# Patient Record
Sex: FEMALE | Race: WHITE | Employment: OTHER | URBAN - METROPOLITAN AREA
[De-identification: names, ages, dates, MRNs, and addresses within clinical notes are randomized per-mention and may not be internally consistent; named-entity substitution may affect disease eponyms.]

---

## 2023-04-03 ENCOUNTER — HOSPITAL ENCOUNTER (INPATIENT)
Age: 78
LOS: 2 days | Discharge: HOME | DRG: 190 | End: 2023-04-05
Attending: EMERGENCY MEDICINE | Admitting: INTERNAL MEDICINE
Payer: MEDICARE

## 2023-04-03 ENCOUNTER — APPOINTMENT (OUTPATIENT)
Dept: CT IMAGING | Facility: CLINIC | Age: 78
DRG: 190 | End: 2023-04-03
Payer: MEDICARE

## 2023-04-03 DIAGNOSIS — J44.1 ACUTE EXACERBATION OF CHRONIC OBSTRUCTIVE PULMONARY DISEASE (COPD) (HCC): ICD-10-CM

## 2023-04-03 DIAGNOSIS — I82.4Z2 ACUTE DEEP VEIN THROMBOSIS (DVT) OF DISTAL VEIN OF LEFT LOWER EXTREMITY (HCC): ICD-10-CM

## 2023-04-03 DIAGNOSIS — I27.20 PULMONARY HYPERTENSION (HCC): ICD-10-CM

## 2023-04-03 DIAGNOSIS — R06.00 ACUTE DYSPNEA: Primary | ICD-10-CM

## 2023-04-03 PROBLEM — E83.51 HYPOCALCEMIA: Status: ACTIVE | Noted: 2019-12-16

## 2023-04-03 PROBLEM — I95.9 HYPOTENSION: Status: ACTIVE | Noted: 2019-12-31

## 2023-04-03 PROBLEM — R26.89 IMPAIRMENT OF BALANCE: Status: ACTIVE | Noted: 2021-03-09

## 2023-04-03 PROBLEM — D72.829 LEUKOCYTOSIS: Status: ACTIVE | Noted: 2019-12-16

## 2023-04-03 PROBLEM — R06.89 HYPERCAPNIA: Status: ACTIVE | Noted: 2019-12-20

## 2023-04-03 PROBLEM — I10 ESSENTIAL HYPERTENSION: Status: ACTIVE | Noted: 2019-12-12

## 2023-04-03 PROBLEM — R74.8 ALKALINE PHOSPHATASE RAISED: Status: ACTIVE | Noted: 2019-12-31

## 2023-04-03 PROBLEM — J43.8 OTHER EMPHYSEMA (HCC): Status: ACTIVE | Noted: 2019-12-31

## 2023-04-03 PROBLEM — G93.32 CHRONIC FATIGUE SYNDROME: Status: ACTIVE | Noted: 2023-04-03

## 2023-04-03 PROBLEM — F41.1 GENERALIZED ANXIETY DISORDER: Status: ACTIVE | Noted: 2023-04-03

## 2023-04-03 PROBLEM — I71.43 ANEURYSM OF INFRARENAL ABDOMINAL AORTA (HCC): Status: ACTIVE | Noted: 2019-12-12

## 2023-04-03 PROBLEM — Z95.828 HISTORY OF REPAIR OF ANEURYSM OF ABDOMINAL AORTA USING ENDOVASCULAR STENT GRAFT: Status: ACTIVE | Noted: 2019-12-12

## 2023-04-03 PROBLEM — E78.5 HYPERLIPIDEMIA: Status: ACTIVE | Noted: 2023-04-03

## 2023-04-03 PROBLEM — Z99.81 DEPENDENCE ON SUPPLEMENTAL OXYGEN: Status: ACTIVE | Noted: 2019-12-12

## 2023-04-03 PROBLEM — G47.00 INSOMNIA: Status: ACTIVE | Noted: 2023-04-03

## 2023-04-03 PROBLEM — J44.9 CHRONIC OBSTRUCTIVE LUNG DISEASE (HCC): Status: ACTIVE | Noted: 2019-12-31

## 2023-04-03 PROBLEM — E03.9 HYPOTHYROIDISM: Status: ACTIVE | Noted: 2023-04-03

## 2023-04-03 PROBLEM — R09.02 HYPOXIA: Status: ACTIVE | Noted: 2019-12-23

## 2023-04-03 PROBLEM — R06.09 DYSPNEA ON EXERTION: Status: ACTIVE | Noted: 2019-12-16

## 2023-04-03 LAB
ABSOLUTE EOS #: 0.1 K/UL (ref 0–0.4)
ABSOLUTE LYMPH #: 0.9 K/UL (ref 1–4.8)
ABSOLUTE MONO #: 0.5 K/UL (ref 0.1–1.2)
ANION GAP SERPL CALCULATED.3IONS-SCNC: 12 MMOL/L (ref 9–17)
BASOPHILS # BLD: 1 % (ref 0–2)
BASOPHILS ABSOLUTE: 0 K/UL (ref 0–0.2)
BNP SERPL-MCNC: ABNORMAL PG/ML
BUN SERPL-MCNC: 21 MG/DL (ref 8–23)
CALCIUM SERPL-MCNC: 9.5 MG/DL (ref 8.6–10.4)
CHLORIDE SERPL-SCNC: 107 MMOL/L (ref 98–107)
CO2 SERPL-SCNC: 22 MMOL/L (ref 20–31)
CREAT SERPL-MCNC: 1.1 MG/DL (ref 0.5–0.9)
CRP SERPL HS-MCNC: 5.1 MG/L (ref 0–5)
D DIMER BLD IA.RAPID-MCNC: 8.27 MG/L FEU
EOSINOPHILS RELATIVE PERCENT: 2 % (ref 1–4)
GFR SERPL CREATININE-BSD FRML MDRD: 51 ML/MIN/1.73M2
GLUCOSE SERPL-MCNC: 94 MG/DL (ref 70–99)
HCT VFR BLD AUTO: 41.1 % (ref 36–46)
HGB BLD-MCNC: 13.4 G/DL (ref 12–16)
LYMPHOCYTES # BLD: 16 % (ref 24–44)
MCH RBC QN AUTO: 27.4 PG (ref 26–34)
MCHC RBC AUTO-ENTMCNC: 32.6 G/DL (ref 31–37)
MCV RBC AUTO: 83.9 FL (ref 80–100)
MONOCYTES # BLD: 10 % (ref 2–11)
PDW BLD-RTO: 16 % (ref 12.5–15.4)
PLATELET # BLD AUTO: 226 K/UL (ref 140–450)
PMV BLD AUTO: 8.4 FL (ref 6–12)
POTASSIUM SERPL-SCNC: 4.9 MMOL/L (ref 3.7–5.3)
RBC # BLD: 4.9 M/UL (ref 4–5.2)
SARS-COV-2 RDRP RESP QL NAA+PROBE: NOT DETECTED
SEG NEUTROPHILS: 71 % (ref 36–66)
SEGMENTED NEUTROPHILS ABSOLUTE COUNT: 3.9 K/UL (ref 1.8–7.7)
SODIUM SERPL-SCNC: 141 MMOL/L (ref 135–144)
SPECIMEN DESCRIPTION: NORMAL
TROPONIN I SERPL DL<=0.01 NG/ML-MCNC: 19 NG/L (ref 0–14)
TROPONIN I SERPL DL<=0.01 NG/ML-MCNC: 22 NG/L (ref 0–14)
WBC # BLD AUTO: 5.4 K/UL (ref 3.5–11)

## 2023-04-03 PROCEDURE — 80048 BASIC METABOLIC PNL TOTAL CA: CPT

## 2023-04-03 PROCEDURE — 84484 ASSAY OF TROPONIN QUANT: CPT

## 2023-04-03 PROCEDURE — 96374 THER/PROPH/DIAG INJ IV PUSH: CPT

## 2023-04-03 PROCEDURE — 2580000003 HC RX 258: Performed by: NURSE PRACTITIONER

## 2023-04-03 PROCEDURE — 85379 FIBRIN DEGRADATION QUANT: CPT

## 2023-04-03 PROCEDURE — 85025 COMPLETE CBC W/AUTO DIFF WBC: CPT

## 2023-04-03 PROCEDURE — 6360000004 HC RX CONTRAST MEDICATION: Performed by: EMERGENCY MEDICINE

## 2023-04-03 PROCEDURE — 86140 C-REACTIVE PROTEIN: CPT

## 2023-04-03 PROCEDURE — 83880 ASSAY OF NATRIURETIC PEPTIDE: CPT

## 2023-04-03 PROCEDURE — 99222 1ST HOSP IP/OBS MODERATE 55: CPT | Performed by: NURSE PRACTITIONER

## 2023-04-03 PROCEDURE — 99285 EMERGENCY DEPT VISIT HI MDM: CPT

## 2023-04-03 PROCEDURE — 94761 N-INVAS EAR/PLS OXIMETRY MLT: CPT

## 2023-04-03 PROCEDURE — 2700000000 HC OXYGEN THERAPY PER DAY

## 2023-04-03 PROCEDURE — 71260 CT THORAX DX C+: CPT | Performed by: REGISTERED NURSE

## 2023-04-03 PROCEDURE — 6360000002 HC RX W HCPCS: Performed by: NURSE PRACTITIONER

## 2023-04-03 PROCEDURE — 2580000003 HC RX 258: Performed by: EMERGENCY MEDICINE

## 2023-04-03 PROCEDURE — 6360000002 HC RX W HCPCS: Performed by: EMERGENCY MEDICINE

## 2023-04-03 PROCEDURE — 2060000000 HC ICU INTERMEDIATE R&B

## 2023-04-03 PROCEDURE — 87635 SARS-COV-2 COVID-19 AMP PRB: CPT

## 2023-04-03 PROCEDURE — 93005 ELECTROCARDIOGRAM TRACING: CPT | Performed by: REGISTERED NURSE

## 2023-04-03 PROCEDURE — 36415 COLL VENOUS BLD VENIPUNCTURE: CPT

## 2023-04-03 RX ORDER — ACETAMINOPHEN 650 MG/1
650 SUPPOSITORY RECTAL EVERY 6 HOURS PRN
Status: DISCONTINUED | OUTPATIENT
Start: 2023-04-03 | End: 2023-04-05 | Stop reason: HOSPADM

## 2023-04-03 RX ORDER — SODIUM CHLORIDE 9 MG/ML
INJECTION, SOLUTION INTRAVENOUS PRN
Status: DISCONTINUED | OUTPATIENT
Start: 2023-04-03 | End: 2023-04-05 | Stop reason: HOSPADM

## 2023-04-03 RX ORDER — SODIUM CHLORIDE 0.9 % (FLUSH) 0.9 %
10 SYRINGE (ML) INJECTION PRN
Status: DISCONTINUED | OUTPATIENT
Start: 2023-04-03 | End: 2023-04-05 | Stop reason: HOSPADM

## 2023-04-03 RX ORDER — PREDNISONE 20 MG/1
40 TABLET ORAL DAILY
Status: DISCONTINUED | OUTPATIENT
Start: 2023-04-06 | End: 2023-04-04

## 2023-04-03 RX ORDER — SODIUM CHLORIDE 9 MG/ML
INJECTION, SOLUTION INTRAVENOUS CONTINUOUS
Status: DISCONTINUED | OUTPATIENT
Start: 2023-04-03 | End: 2023-04-03

## 2023-04-03 RX ORDER — ONDANSETRON 2 MG/ML
4 INJECTION INTRAMUSCULAR; INTRAVENOUS EVERY 6 HOURS PRN
Status: DISCONTINUED | OUTPATIENT
Start: 2023-04-03 | End: 2023-04-04

## 2023-04-03 RX ORDER — ONDANSETRON 2 MG/ML
4 INJECTION INTRAMUSCULAR; INTRAVENOUS ONCE
Status: COMPLETED | OUTPATIENT
Start: 2023-04-03 | End: 2023-04-03

## 2023-04-03 RX ORDER — IPRATROPIUM BROMIDE AND ALBUTEROL SULFATE 2.5; .5 MG/3ML; MG/3ML
1 SOLUTION RESPIRATORY (INHALATION)
Status: DISCONTINUED | OUTPATIENT
Start: 2023-04-04 | End: 2023-04-03

## 2023-04-03 RX ORDER — ALBUTEROL SULFATE 2.5 MG/3ML
2.5 SOLUTION RESPIRATORY (INHALATION)
Status: DISCONTINUED | OUTPATIENT
Start: 2023-04-03 | End: 2023-04-05 | Stop reason: HOSPADM

## 2023-04-03 RX ORDER — ENOXAPARIN SODIUM 100 MG/ML
40 INJECTION SUBCUTANEOUS DAILY
Status: DISCONTINUED | OUTPATIENT
Start: 2023-04-04 | End: 2023-04-05

## 2023-04-03 RX ORDER — SODIUM CHLORIDE 0.9 % (FLUSH) 0.9 %
5-40 SYRINGE (ML) INJECTION EVERY 12 HOURS SCHEDULED
Status: DISCONTINUED | OUTPATIENT
Start: 2023-04-03 | End: 2023-04-05 | Stop reason: HOSPADM

## 2023-04-03 RX ORDER — METHYLPREDNISOLONE SODIUM SUCCINATE 40 MG/ML
40 INJECTION, POWDER, LYOPHILIZED, FOR SOLUTION INTRAMUSCULAR; INTRAVENOUS EVERY 6 HOURS
Status: DISCONTINUED | OUTPATIENT
Start: 2023-04-03 | End: 2023-04-04

## 2023-04-03 RX ORDER — IPRATROPIUM BROMIDE AND ALBUTEROL SULFATE 2.5; .5 MG/3ML; MG/3ML
1 SOLUTION RESPIRATORY (INHALATION) 2 TIMES DAILY
Status: DISCONTINUED | OUTPATIENT
Start: 2023-04-03 | End: 2023-04-05 | Stop reason: HOSPADM

## 2023-04-03 RX ORDER — POLYETHYLENE GLYCOL 3350 17 G/17G
17 POWDER, FOR SOLUTION ORAL DAILY PRN
Status: DISCONTINUED | OUTPATIENT
Start: 2023-04-03 | End: 2023-04-05 | Stop reason: HOSPADM

## 2023-04-03 RX ORDER — ACETAMINOPHEN 325 MG/1
650 TABLET ORAL EVERY 6 HOURS PRN
Status: DISCONTINUED | OUTPATIENT
Start: 2023-04-03 | End: 2023-04-05 | Stop reason: HOSPADM

## 2023-04-03 RX ORDER — SODIUM CHLORIDE 0.9 % (FLUSH) 0.9 %
5-40 SYRINGE (ML) INJECTION PRN
Status: DISCONTINUED | OUTPATIENT
Start: 2023-04-03 | End: 2023-04-05 | Stop reason: HOSPADM

## 2023-04-03 RX ORDER — FAMOTIDINE 20 MG/1
20 TABLET, FILM COATED ORAL DAILY
Status: DISCONTINUED | OUTPATIENT
Start: 2023-04-04 | End: 2023-04-05 | Stop reason: HOSPADM

## 2023-04-03 RX ORDER — ONDANSETRON 4 MG/1
4 TABLET, ORALLY DISINTEGRATING ORAL EVERY 8 HOURS PRN
Status: DISCONTINUED | OUTPATIENT
Start: 2023-04-03 | End: 2023-04-04

## 2023-04-03 RX ORDER — SODIUM CHLORIDE 9 MG/ML
INJECTION, SOLUTION INTRAVENOUS CONTINUOUS
Status: ACTIVE | OUTPATIENT
Start: 2023-04-03 | End: 2023-04-04

## 2023-04-03 RX ORDER — 0.9 % SODIUM CHLORIDE 0.9 %
70 INTRAVENOUS SOLUTION INTRAVENOUS ONCE
Status: COMPLETED | OUTPATIENT
Start: 2023-04-03 | End: 2023-04-03

## 2023-04-03 RX ADMIN — ONDANSETRON 4 MG: 2 INJECTION INTRAMUSCULAR; INTRAVENOUS at 19:12

## 2023-04-03 RX ADMIN — METHYLPREDNISOLONE SODIUM SUCCINATE 40 MG: 40 INJECTION, POWDER, FOR SOLUTION INTRAMUSCULAR; INTRAVENOUS at 23:28

## 2023-04-03 RX ADMIN — SODIUM CHLORIDE, PRESERVATIVE FREE 10 ML: 5 INJECTION INTRAVENOUS at 23:25

## 2023-04-03 RX ADMIN — SODIUM CHLORIDE, PRESERVATIVE FREE 10 ML: 5 INJECTION INTRAVENOUS at 19:03

## 2023-04-03 RX ADMIN — IOPAMIDOL 80 ML: 755 INJECTION, SOLUTION INTRAVENOUS at 19:02

## 2023-04-03 RX ADMIN — SODIUM CHLORIDE: 9 INJECTION, SOLUTION INTRAVENOUS at 23:31

## 2023-04-03 RX ADMIN — SODIUM CHLORIDE 70 ML: 9 INJECTION, SOLUTION INTRAVENOUS at 19:03

## 2023-04-03 ASSESSMENT — PAIN - FUNCTIONAL ASSESSMENT: PAIN_FUNCTIONAL_ASSESSMENT: 0-10

## 2023-04-03 ASSESSMENT — ENCOUNTER SYMPTOMS
ABDOMINAL PAIN: 0
BACK PAIN: 0
COUGH: 0
SORE THROAT: 0
SHORTNESS OF BREATH: 1
NAUSEA: 0
DIARRHEA: 0
VOMITING: 0

## 2023-04-03 ASSESSMENT — LIFESTYLE VARIABLES
HOW MANY STANDARD DRINKS CONTAINING ALCOHOL DO YOU HAVE ON A TYPICAL DAY: PATIENT DOES NOT DRINK
HOW OFTEN DO YOU HAVE A DRINK CONTAINING ALCOHOL: NEVER

## 2023-04-03 ASSESSMENT — PAIN SCALES - GENERAL
PAINLEVEL_OUTOF10: 0
PAINLEVEL_OUTOF10: 0

## 2023-04-03 NOTE — ED PROVIDER NOTES
Cardiovascular:  Negative for chest pain and palpitations. Gastrointestinal:  Negative for abdominal pain, diarrhea, nausea and vomiting. Genitourinary:  Negative for dysuria and hematuria. Musculoskeletal:  Negative for back pain and neck pain. Neurological:  Negative for dizziness and headaches. All other systems negative except as marked. PHYSICAL EXAM  (up to 7 for level 4, 8 or more for level 5)      INITIAL VITALS:  height is 5' 9\" (1.753 m) and weight is 71.6 kg (157 lb 12.8 oz). Her oral temperature is 97.5 °F (36.4 °C). Her blood pressure is 128/79 and her pulse is 75. Her respiration is 17 and oxygen saturation is 97%. Vital signs reviewed. Physical Exam  Constitutional:       General: She is not in acute distress. Appearance: Normal appearance. She is well-developed. She is not ill-appearing or toxic-appearing. HENT:      Head: Normocephalic and atraumatic. Neck:      Vascular: No JVD. Trachea: No tracheal deviation. Cardiovascular:      Rate and Rhythm: Normal rate and regular rhythm. Pulses: Normal pulses. Heart sounds: Normal heart sounds. Pulmonary:      Effort: Pulmonary effort is normal.      Breath sounds: Normal breath sounds. Abdominal:      Palpations: Abdomen is soft. There is no hepatomegaly, splenomegaly or mass. Tenderness: There is no abdominal tenderness. There is no guarding or rebound. Musculoskeletal:      Cervical back: Neck supple. Right lower leg: No edema. Left lower leg: No edema. Skin:     General: Skin is warm and dry. Capillary Refill: Capillary refill takes less than 2 seconds. Neurological:      Mental Status: She is alert.    Psychiatric:         Mood and Affect: Mood normal.         Behavior: Behavior normal.         DIFFERENTIAL DIAGNOSIS / MDM     Differential diagnosis considered: ACS, PE, pneumonia, COVID, CHF, anemia    Chronic Conditions affecting care (DM,HTN,CA, etc):

## 2023-04-03 NOTE — ED NOTES
Patient vomited up green bile. Patient states this is the second time she had vomited since she has been here.       Katelyn Julio 139, RN  04/03/23 4612

## 2023-04-03 NOTE — ED NOTES
Iv attempt x2 , unable to obtain access, pt tolerated without complaints,2x2  applied to [facility], [address] [place] [address], RN  04/03/23 1648

## 2023-04-03 NOTE — ED PROVIDER NOTES
TROPONIN - Abnormal; Notable for the following components:    Troponin, High Sensitivity 22 (*)     All other components within normal limits   BRAIN NATRIURETIC PEPTIDE - Abnormal; Notable for the following components:    Pro-BNP 12,045 (*)     All other components within normal limits   TROPONIN - Abnormal; Notable for the following components:    Troponin, High Sensitivity 19 (*)     All other components within normal limits   C-REACTIVE PROTEIN - Abnormal; Notable for the following components:    CRP 5.1 (*)     All other components within normal limits   BASIC METABOLIC PANEL W/ REFLEX TO MG FOR LOW K - Abnormal; Notable for the following components:    Glucose 114 (*)     Creatinine 1.11 (*)     Est, Glom Filt Rate 51 (*)     All other components within normal limits   CBC - Abnormal; Notable for the following components:    RDW 14.8 (*)     All other components within normal limits   BASIC METABOLIC PANEL W/ REFLEX TO MG FOR LOW K - Abnormal; Notable for the following components:    Glucose 138 (*)     BUN 27 (*)     Creatinine 1.20 (*)     Est, Glom Filt Rate 46 (*)     Bun/Cre Ratio 23 (*)     All other components within normal limits   CBC - Abnormal; Notable for the following components:    RDW 15.0 (*)     All other components within normal limits   COVID-19, RAPID   GRAM STAIN   CULTURE, RESPIRATORY   D-DIMER, QUANTITATIVE   TSH WITH REFLEX        CT CHEST PULMONARY EMBOLISM W CONTRAST (Final result)  Result time 04/03/23 21:27:17  Final result by Burt Bamberger, MD (04/03/23 21:27:17)                Impression:    1. No evidence of acute pulmonary embolism or acute aortic disease. Main   pulmonary artery appears prominent measuring 3.3 cm possibly related to   pulmonary arterial hypertension. 2. Mediastinal and right hilar lymphadenopathy. 3. Underlying severe emphysema. No active infiltrates or suspicious nodules   or masses. Interstitial thickening in both lower lobes.   No active pleural

## 2023-04-04 ENCOUNTER — APPOINTMENT (OUTPATIENT)
Dept: GENERAL RADIOLOGY | Age: 78
DRG: 190 | End: 2023-04-04
Payer: MEDICARE

## 2023-04-04 PROBLEM — I50.9 NEW ONSET OF CONGESTIVE HEART FAILURE (HCC): Status: ACTIVE | Noted: 2023-04-04

## 2023-04-04 LAB
ANION GAP SERPL CALCULATED.3IONS-SCNC: 12 MMOL/L (ref 9–17)
BUN SERPL-MCNC: 18 MG/DL (ref 8–23)
BUN/CREAT BLD: 16 (ref 9–20)
CALCIUM SERPL-MCNC: 8.9 MG/DL (ref 8.6–10.4)
CHLORIDE SERPL-SCNC: 106 MMOL/L (ref 98–107)
CO2 SERPL-SCNC: 21 MMOL/L (ref 20–31)
CREAT SERPL-MCNC: 1.11 MG/DL (ref 0.5–0.9)
EKG ATRIAL RATE: 72 BPM
EKG P AXIS: 66 DEGREES
EKG P-R INTERVAL: 154 MS
EKG Q-T INTERVAL: 422 MS
EKG QRS DURATION: 82 MS
EKG QTC CALCULATION (BAZETT): 462 MS
EKG R AXIS: -74 DEGREES
EKG T AXIS: -54 DEGREES
EKG VENTRICULAR RATE: 72 BPM
GFR SERPL CREATININE-BSD FRML MDRD: 51 ML/MIN/1.73M2
GLUCOSE SERPL-MCNC: 114 MG/DL (ref 70–99)
HCT VFR BLD AUTO: 43.8 % (ref 36.3–47.1)
HGB BLD-MCNC: 13.1 G/DL (ref 11.9–15.1)
LV EF: 63 %
LVEF MODALITY: NORMAL
MCH RBC QN AUTO: 26.5 PG (ref 25.2–33.5)
MCHC RBC AUTO-ENTMCNC: 29.9 G/DL (ref 28.4–34.8)
MCV RBC AUTO: 88.5 FL (ref 82.6–102.9)
NRBC AUTOMATED: 0 PER 100 WBC
PDW BLD-RTO: 14.8 % (ref 11.8–14.4)
PLATELET # BLD AUTO: 235 K/UL (ref 138–453)
PMV BLD AUTO: 10.6 FL (ref 8.1–13.5)
POTASSIUM SERPL-SCNC: 4.7 MMOL/L (ref 3.7–5.3)
RBC # BLD: 4.95 M/UL (ref 3.95–5.11)
SODIUM SERPL-SCNC: 139 MMOL/L (ref 135–144)
TSH SERPL-ACNC: 4.67 UIU/ML (ref 0.3–5)
WBC # BLD AUTO: 3.6 K/UL (ref 3.5–11.3)

## 2023-04-04 PROCEDURE — 6370000000 HC RX 637 (ALT 250 FOR IP): Performed by: NURSE PRACTITIONER

## 2023-04-04 PROCEDURE — 99232 SBSQ HOSP IP/OBS MODERATE 35: CPT | Performed by: STUDENT IN AN ORGANIZED HEALTH CARE EDUCATION/TRAINING PROGRAM

## 2023-04-04 PROCEDURE — 97535 SELF CARE MNGMENT TRAINING: CPT

## 2023-04-04 PROCEDURE — 36415 COLL VENOUS BLD VENIPUNCTURE: CPT

## 2023-04-04 PROCEDURE — 97166 OT EVAL MOD COMPLEX 45 MIN: CPT

## 2023-04-04 PROCEDURE — 2700000000 HC OXYGEN THERAPY PER DAY

## 2023-04-04 PROCEDURE — 94761 N-INVAS EAR/PLS OXIMETRY MLT: CPT

## 2023-04-04 PROCEDURE — 84443 ASSAY THYROID STIM HORMONE: CPT

## 2023-04-04 PROCEDURE — 6360000002 HC RX W HCPCS: Performed by: NURSE PRACTITIONER

## 2023-04-04 PROCEDURE — 71045 X-RAY EXAM CHEST 1 VIEW: CPT

## 2023-04-04 PROCEDURE — 85027 COMPLETE CBC AUTOMATED: CPT

## 2023-04-04 PROCEDURE — 97530 THERAPEUTIC ACTIVITIES: CPT

## 2023-04-04 PROCEDURE — 94640 AIRWAY INHALATION TREATMENT: CPT

## 2023-04-04 PROCEDURE — 93306 TTE W/DOPPLER COMPLETE: CPT

## 2023-04-04 PROCEDURE — 2580000003 HC RX 258: Performed by: NURSE PRACTITIONER

## 2023-04-04 PROCEDURE — 2060000000 HC ICU INTERMEDIATE R&B

## 2023-04-04 PROCEDURE — 80048 BASIC METABOLIC PNL TOTAL CA: CPT

## 2023-04-04 RX ORDER — FUROSEMIDE 20 MG/1
20 TABLET ORAL DAILY
Status: DISCONTINUED | OUTPATIENT
Start: 2023-04-04 | End: 2023-04-05 | Stop reason: HOSPADM

## 2023-04-04 RX ORDER — ONDANSETRON 4 MG/1
4 TABLET, FILM COATED ORAL DAILY PRN
Qty: 30 TABLET | Refills: 0 | Status: SHIPPED | OUTPATIENT
Start: 2023-04-04 | End: 2023-04-05 | Stop reason: SDUPTHER

## 2023-04-04 RX ORDER — LISINOPRIL 5 MG/1
5 TABLET ORAL DAILY
Status: DISCONTINUED | OUTPATIENT
Start: 2023-04-04 | End: 2023-04-05 | Stop reason: HOSPADM

## 2023-04-04 RX ORDER — TIOTROPIUM BROMIDE 18 UG/1
18 CAPSULE ORAL; RESPIRATORY (INHALATION) DAILY
Qty: 90 CAPSULE | Refills: 1 | Status: SHIPPED | OUTPATIENT
Start: 2023-04-04 | End: 2023-04-05 | Stop reason: SDUPTHER

## 2023-04-04 RX ORDER — PROMETHAZINE HYDROCHLORIDE 25 MG/ML
6.25 INJECTION, SOLUTION INTRAMUSCULAR; INTRAVENOUS EVERY 6 HOURS PRN
Status: DISCONTINUED | OUTPATIENT
Start: 2023-04-04 | End: 2023-04-04

## 2023-04-04 RX ORDER — BUDESONIDE AND FORMOTEROL FUMARATE DIHYDRATE 160; 4.5 UG/1; UG/1
2 AEROSOL RESPIRATORY (INHALATION) 2 TIMES DAILY
Qty: 30.6 G | Refills: 1 | Status: SHIPPED | OUTPATIENT
Start: 2023-04-04 | End: 2023-04-05 | Stop reason: SDUPTHER

## 2023-04-04 RX ORDER — PROMETHAZINE HYDROCHLORIDE 12.5 MG/1
6.25 TABLET ORAL EVERY 6 HOURS PRN
Status: DISCONTINUED | OUTPATIENT
Start: 2023-04-04 | End: 2023-04-05 | Stop reason: HOSPADM

## 2023-04-04 RX ORDER — ALBUTEROL SULFATE 90 UG/1
2 AEROSOL, METERED RESPIRATORY (INHALATION) EVERY 6 HOURS PRN
Qty: 18 G | Refills: 1 | Status: SHIPPED | OUTPATIENT
Start: 2023-04-04 | End: 2023-04-05 | Stop reason: SDUPTHER

## 2023-04-04 RX ORDER — PREDNISONE 20 MG/1
40 TABLET ORAL DAILY
Status: DISCONTINUED | OUTPATIENT
Start: 2023-04-04 | End: 2023-04-05 | Stop reason: HOSPADM

## 2023-04-04 RX ORDER — ALPRAZOLAM 0.25 MG/1
0.25 TABLET ORAL NIGHTLY PRN
Status: DISCONTINUED | OUTPATIENT
Start: 2023-04-04 | End: 2023-04-05 | Stop reason: HOSPADM

## 2023-04-04 RX ADMIN — ACETAMINOPHEN 650 MG: 325 TABLET ORAL at 15:12

## 2023-04-04 RX ADMIN — SODIUM CHLORIDE, PRESERVATIVE FREE 10 ML: 5 INJECTION INTRAVENOUS at 10:40

## 2023-04-04 RX ADMIN — IPRATROPIUM BROMIDE AND ALBUTEROL SULFATE 1 AMPULE: 2.5; .5 SOLUTION RESPIRATORY (INHALATION) at 08:26

## 2023-04-04 RX ADMIN — LISINOPRIL 5 MG: 5 TABLET ORAL at 09:15

## 2023-04-04 RX ADMIN — FAMOTIDINE 20 MG: 20 TABLET, FILM COATED ORAL at 09:15

## 2023-04-04 RX ADMIN — ENOXAPARIN SODIUM 40 MG: 100 INJECTION SUBCUTANEOUS at 09:15

## 2023-04-04 RX ADMIN — IPRATROPIUM BROMIDE AND ALBUTEROL SULFATE 1 AMPULE: 2.5; .5 SOLUTION RESPIRATORY (INHALATION) at 19:54

## 2023-04-04 RX ADMIN — PREDNISONE 40 MG: 20 TABLET ORAL at 09:15

## 2023-04-04 RX ADMIN — ONDANSETRON 4 MG: 2 INJECTION INTRAMUSCULAR; INTRAVENOUS at 17:38

## 2023-04-04 RX ADMIN — ALPRAZOLAM 0.25 MG: 0.25 TABLET ORAL at 00:29

## 2023-04-04 RX ADMIN — SODIUM CHLORIDE, PRESERVATIVE FREE 10 ML: 5 INJECTION INTRAVENOUS at 19:22

## 2023-04-04 ASSESSMENT — ENCOUNTER SYMPTOMS
DIARRHEA: 1
VOMITING: 1
CONSTIPATION: 0
SHORTNESS OF BREATH: 1
COLOR CHANGE: 0
NAUSEA: 1
COUGH: 1
CHEST TIGHTNESS: 1
WHEEZING: 0
EYES NEGATIVE: 1

## 2023-04-04 ASSESSMENT — PAIN SCALES - GENERAL: PAINLEVEL_OUTOF10: 0

## 2023-04-04 NOTE — H&P
chills, and a slight intermittent cough with very little sputum. She says she used to wear supplemental O2 at home but does not currently wear it. She moved to [place] from [place] a few years ago and has not seen a healthcare provider for approximately 3 years. She is a former smoker and quit smoking 4 years ago. She denies any alcohol or drug use. She is not aware of any lung disease. No recent antibiotic use. She also complains of some nausea and vomiting. She denies any appetite changes. According to past medical records, she has a past medical history of AAA and repair, DVT, left foot ulcer, PE, hypothyroidism, systolic murmur, and memory loss. She currently lives alone in an apartment. She was initially evaluated in the [facility] ED. Pro BNP was noted to be elevated at 12,045. D-dimer was elevated at 8.27. CT of the chest was completed and revealed no PE.  CT revealed some pulmonary hypertension however, along with emphysema. He was noted to have a drop in her O2 sat while walking and was placed on 3 L desponded well. Decision was made to transfer patient to [facility] for further management of acute COPD exacerbation and pulmonary hypertension. Past Medical History:     Past Medical History:   Diagnosis Date    AAA (abdominal aortic aneurysm) ([facility] [address]     Dehydration     DVT (deep venous thrombosis) (HCC)     Fall     Foot ulcer, left ([facility] [address]     History of pulmonary embolus (PE)     Hypothyroid     Memory loss     Systolic murmur     Tinea pedis     UTI (urinary tract infection)         Past Surgical History:     Past Surgical History:   Procedure Laterality Date    ABDOMINAL AORTIC ANEURYSM REPAIR  2020        Medications Prior to Admission:     Patient denies any medications prior to admission    Allergies:     Sulfa antibiotics and Sulfamethoxazole-trimethoprim    Social History:     Tobacco:    reports that she has never smoked.  She has never used smokeless tobacco.  Alcohol:      reports

## 2023-04-04 NOTE — PLAN OF CARE
Problem: Respiratory - Adult  Goal: Able to breathe comfortably  4/4/2023 1026 by Merlni Zhao RCP  Outcome: Progressing     Problem: Respiratory - Adult  Goal: Patient's breath sounds will be clear and equal  4/4/2023 1026 by Merlin Zhao RCP  Outcome: Progressing     Problem: Respiratory - Adult  Goal: Adequate oxygenation  Description: Adequate oxygenation  4/4/2023 1026 by Delisa Zhao RCP  Outcome: Progressing     Problem: Respiratory - Adult  Goal: Achieves optimal ventilation and oxygenation  4/4/2023 1026 by Merlin Zhao RCP  Outcome: Progressing

## 2023-04-04 NOTE — PLAN OF CARE
Problem: Respiratory - Adult  Goal: Able to breathe comfortably  4/4/2023 1953 by Ernie Pyle RCP  Outcome: Progressing     Problem: Respiratory - Adult  Goal: Patient's breath sounds will be clear and equal  4/4/2023 1953 by Ernie Pyle RCP  Outcome: Progressing     Problem: Respiratory - Adult  Goal: Adequate oxygenation  Description: Adequate oxygenation  4/4/2023 1953 by Ernie Pyle RCP  Outcome: Progressing      BRONCHOSPASM/BRONCHOCONSTRICTION    IMPROVE  AERATION/BREATHSOUNDS  ADMINISTER BRONCHODILATOR THERAPY AS APPROPRIATE  ASSESS BREATH SOUNDS  PATIENT EDUCATION AS NEEDED          PROVIDE ADEQUATE OXYGENATION WITH ACCEPTABLE SP02/ABG'S    [x]  IDENTIFY APPROPRIATE OXYGEN THERAPY  [x]   MONITOR SP02/ABG'S AS NEEDED   [x]   PATIENT EDUCATION AS NEEDED

## 2023-04-04 NOTE — ED NOTES
Patient 85 % on room air. Oxygen placed  3 l nasal cannula for comfort. Family at bedside.       Katelyn Leigh, RN  04/03/23 2020

## 2023-04-04 NOTE — CARE COORDINATION
Case Management Assessment  Initial Evaluation    Date/Time of Evaluation: 4/4/2023 2:09 PM  Assessment Completed by: Rex Chang RN    If patient is discharged prior to next notation, then this note serves as note for discharge by case management. Patient Name: Camilo Moran                   YOB: 1945  Diagnosis: Pulmonary hypertension ([facility] [address] [I27.20]  Acute dyspnea [R06.00]                   Date / Time: 4/3/2023  3:51 PM    Patient Admission Status: Inpatient   Readmission Risk (Low < 19, Mod (19-27), High > 27): Readmission Risk Score: 8.6    Current PCP: No primary care provider on file. PCP verified by CM? No (PCP list provided)    Chart Reviewed: Yes      History Provided by: Patient  Patient Orientation: Alert and Oriented, Person, Place    Patient Cognition: Alert    Hospitalization in the last 30 days (Readmission):  No    If yes, Readmission Assessment in CM Navigator will be completed. Advance Directives:      Code Status: Full Code   Patient's Primary Decision Maker is: Legal Next of Kin      Discharge Planning:    Patient lives with: Alone Type of Home: Apartment  Primary Care Giver: Self  Patient Support Systems include: Family Members   Current Financial resources: None  Current community resources: None  Current services prior to admission: None            Current DME:              Type of Home Care services:  None    ADLS  Prior functional level: Independent in ADLs/IADLs  Current functional level: Independent in ADLs/IADLs    PT AM-PAC:   /24  OT AM-PAC:   /24    Family can provide assistance at DC: No  Would you like Case Management to discuss the discharge plan with any other family members/significant others, and if so, who?  No  Plans to Return to Present Housing: Yes  Other Identified Issues/Barriers to RETURNING to current housing: none  Potential Assistance needed at discharge: [facility]            Potential DME: Oxygen Therapy (Comment)  Patient

## 2023-04-04 NOTE — PLAN OF CARE
Problem: Discharge Planning  Goal: Discharge to home or other facility with appropriate resources  Outcome: Progressing  Flowsheets (Taken 4/4/2023 0154)  Discharge to home or other facility with appropriate resources:   Identify barriers to discharge with patient and caregiver   Arrange for needed discharge resources and transportation as appropriate   Identify discharge learning needs (meds, wound care, etc)     Problem: Pain  Goal: Verbalizes/displays adequate comfort level or baseline comfort level  Outcome: Progressing  Note: Pain level assessment complete.    Patient educated on pain scale and control interventions  PRN pain medication given per patient request  Patient instructed to call out with new onset of pain or unrelieved pain       Problem: Safety - Adult  Goal: Free from fall injury  Outcome: Progressing  Note: Proper pt identification  Hourly rounding performed  Anticipatory needs met  Non-skid socks worn  Proper transferring technique  2/4 side rails up  Personal necessities within reach  Bed low and locked  Call light in reach  Proper lighting  Room free of clutter       Problem: Respiratory - Adult  Goal: Able to breathe comfortably  4/3/2023 2318 by Connie Ching RCP  Outcome: Progressing  Goal: Patient's breath sounds will be clear and equal  4/3/2023 2318 by Connie Ching RCP  Outcome: Progressing  Goal: Adequate oxygenation  Description: Adequate oxygenation  4/3/2023 2318 by Connie Ching RCP  Outcome: Progressing  Goal: Achieves optimal ventilation and oxygenation  Outcome: Progressing  Flowsheets (Taken 4/4/2023 0154)  Achieves optimal ventilation and oxygenation:   Assess for changes in respiratory status   Assess for changes in mentation and behavior   Position to facilitate oxygenation and minimize respiratory effort   Oxygen supplementation based on oxygen saturation or arterial blood gases

## 2023-04-04 NOTE — DISCHARGE INSTR - COC
(70.3 kg)   SpO2 100%   BMI 22.89 kg/m²     Last documented pain score (0-10 scale): Pain Level: 0  Last Weight:   Wt Readings from Last 1 Encounters:   23 155 lb (70.3 kg)     Mental Status:  {IP PT MENTAL STATUS:78711}    IV Access:  { RITESH IV ACCESS:839893689}    Nursing Mobility/ADLs:  Walking   {P DME OQUS:891112705}  Transfer  {P DME QIJR:180181054}  Bathing  {CHP DME ZEBW:117162227}  Dressing  {CHP DME KCJJ:965195277}  Toileting  {CHP DME AYQT:328562939}  Feeding  {[facility] DME ZSSC:118767488}  Med Admin  {P DME UYUX:931655651}  Med Delivery   { RITESH MED Delivery:205730860}    Wound Care Documentation and Therapy:        Elimination:  Continence: Bowel: {YES / VU:96331}  Bladder: {YES / RC:83958}  Urinary Catheter: {Urinary Catheter:357463109}   Colostomy/Ileostomy/Ileal Conduit: {YES / WI:37644}       Date of Last BM: ***  No intake or output data in the 24 hours ending 23 1222  No intake/output data recorded.     Safety Concerns:     [employer] Safety Concerns:115685221}    Impairments/Disabilities:      [employer] Impairments/Disabilities:814280958}    Nutrition Therapy:  Current Nutrition Therapy:   [employer] Diet List:043206317}    Routes of Feeding: {[facility] DME Other Feedings:827766223}  Liquids: {Slp liquid thickness:10760}  Daily Fluid Restriction: {CHP DME Yes amt example:997430348}  Last Modified Barium Swallow with Video (Video Swallowing Test): {Done Not Done ND:211680555}    Treatments at the Time of Hospital Discharge:   Respiratory Treatments: ***  Oxygen Therapy:  {Therapy; copd oxygen:09646}  Ventilator:    { CC Vent HIWJ:231230613}    Rehab Therapies: {THERAPEUTIC INTERVENTION:5192915068}  Weight Bearing Status/Restrictions: [employer]  Weight Bearin}  Other Medical Equipment (for information only, NOT a DME order):  {EQUIPMENT:033204306}  Other Treatments: ***    Patient's personal belongings (please select all that are sent with patient):  {[facility] DME

## 2023-04-04 NOTE — PLAN OF CARE
Problem: Respiratory - Adult  Goal: Able to breathe comfortably  Outcome: Progressing     Problem: Respiratory - Adult  Goal: Patient's breath sounds will be clear and equal  Outcome: Progressing     Problem: Respiratory - Adult  Goal: Adequate oxygenation  Description: Adequate oxygenation  Outcome: Progressing        BRONCHOSPASM/BRONCHOCONSTRICTION    IMPROVE  AERATION/BREATHSOUNDS  ADMINISTER BRONCHODILATOR THERAPY AS APPROPRIATE  ASSESS BREATH SOUNDS  INITIATE AEROSOL PROTOCOL IF ORDERED TO DO SO  PATIENT EDUCATION AS NEEDED          PROVIDE ADEQUATE OXYGENATION WITH ACCEPTABLE SP02/ABG'S    [x]  IDENTIFY APPROPRIATE OXYGEN THERAPY  [x]   MONITOR SP02/ABG'S AS NEEDED   [x]   PATIENT EDUCATION AS NEEDED

## 2023-04-05 VITALS
HEART RATE: 72 BPM | DIASTOLIC BLOOD PRESSURE: 52 MMHG | RESPIRATION RATE: 16 BRPM | TEMPERATURE: 97.6 F | OXYGEN SATURATION: 97 % | HEIGHT: 69 IN | BODY MASS INDEX: 23 KG/M2 | SYSTOLIC BLOOD PRESSURE: 143 MMHG | WEIGHT: 155.3 LBS

## 2023-04-05 PROBLEM — J44.1 ACUTE EXACERBATION OF CHRONIC OBSTRUCTIVE PULMONARY DISEASE (COPD) (HCC): Status: RESOLVED | Noted: 2023-04-03 | Resolved: 2023-04-05

## 2023-04-05 PROBLEM — I82.4Z2 ACUTE DEEP VEIN THROMBOSIS (DVT) OF DISTAL VEIN OF LEFT LOWER EXTREMITY (HCC): Status: ACTIVE | Noted: 2023-04-05

## 2023-04-05 LAB
ANION GAP SERPL CALCULATED.3IONS-SCNC: 12 MMOL/L (ref 9–17)
BUN SERPL-MCNC: 27 MG/DL (ref 8–23)
BUN/CREAT BLD: 23 (ref 9–20)
CALCIUM SERPL-MCNC: 9 MG/DL (ref 8.6–10.4)
CHLORIDE SERPL-SCNC: 107 MMOL/L (ref 98–107)
CO2 SERPL-SCNC: 20 MMOL/L (ref 20–31)
CREAT SERPL-MCNC: 1.2 MG/DL (ref 0.5–0.9)
GFR SERPL CREATININE-BSD FRML MDRD: 46 ML/MIN/1.73M2
GLUCOSE SERPL-MCNC: 138 MG/DL (ref 70–99)
HCT VFR BLD AUTO: 41 % (ref 36.3–47.1)
HGB BLD-MCNC: 12 G/DL (ref 11.9–15.1)
MCH RBC QN AUTO: 27 PG (ref 25.2–33.5)
MCHC RBC AUTO-ENTMCNC: 29.3 G/DL (ref 28.4–34.8)
MCV RBC AUTO: 92.3 FL (ref 82.6–102.9)
NRBC AUTOMATED: 0 PER 100 WBC
PDW BLD-RTO: 15 % (ref 11.8–14.4)
PLATELET # BLD AUTO: 220 K/UL (ref 138–453)
PMV BLD AUTO: 10.4 FL (ref 8.1–13.5)
POTASSIUM SERPL-SCNC: 4.6 MMOL/L (ref 3.7–5.3)
RBC # BLD: 4.44 M/UL (ref 3.95–5.11)
SODIUM SERPL-SCNC: 139 MMOL/L (ref 135–144)
WBC # BLD AUTO: 10.5 K/UL (ref 3.5–11.3)

## 2023-04-05 PROCEDURE — 80048 BASIC METABOLIC PNL TOTAL CA: CPT

## 2023-04-05 PROCEDURE — 36415 COLL VENOUS BLD VENIPUNCTURE: CPT

## 2023-04-05 PROCEDURE — 94761 N-INVAS EAR/PLS OXIMETRY MLT: CPT

## 2023-04-05 PROCEDURE — 6370000000 HC RX 637 (ALT 250 FOR IP): Performed by: NURSE PRACTITIONER

## 2023-04-05 PROCEDURE — 94640 AIRWAY INHALATION TREATMENT: CPT

## 2023-04-05 PROCEDURE — 99232 SBSQ HOSP IP/OBS MODERATE 35: CPT | Performed by: NURSE PRACTITIONER

## 2023-04-05 PROCEDURE — 93970 EXTREMITY STUDY: CPT

## 2023-04-05 PROCEDURE — 2700000000 HC OXYGEN THERAPY PER DAY

## 2023-04-05 PROCEDURE — 85027 COMPLETE CBC AUTOMATED: CPT

## 2023-04-05 RX ORDER — ALBUTEROL SULFATE 90 UG/1
2 AEROSOL, METERED RESPIRATORY (INHALATION) EVERY 6 HOURS PRN
Qty: 18 G | Refills: 0 | Status: SHIPPED | OUTPATIENT
Start: 2023-04-05

## 2023-04-05 RX ORDER — BUDESONIDE AND FORMOTEROL FUMARATE DIHYDRATE 160; 4.5 UG/1; UG/1
2 AEROSOL RESPIRATORY (INHALATION) 2 TIMES DAILY
Qty: 30.6 G | Refills: 1 | Status: SHIPPED | OUTPATIENT
Start: 2023-04-05

## 2023-04-05 RX ORDER — ONDANSETRON 4 MG/1
4 TABLET, FILM COATED ORAL DAILY PRN
Qty: 30 TABLET | Refills: 0 | Status: SHIPPED | OUTPATIENT
Start: 2023-04-05

## 2023-04-05 RX ORDER — AZITHROMYCIN 500 MG/1
500 TABLET, FILM COATED ORAL DAILY
Qty: 5 TABLET | Refills: 0 | Status: SHIPPED | OUTPATIENT
Start: 2023-04-05 | End: 2023-04-10

## 2023-04-05 RX ORDER — TIOTROPIUM BROMIDE 18 UG/1
18 CAPSULE ORAL; RESPIRATORY (INHALATION) DAILY
Qty: 90 CAPSULE | Refills: 1 | Status: SHIPPED | OUTPATIENT
Start: 2023-04-05

## 2023-04-05 RX ADMIN — APIXABAN 10 MG: 5 TABLET, FILM COATED ORAL at 12:37

## 2023-04-05 RX ADMIN — IPRATROPIUM BROMIDE AND ALBUTEROL SULFATE 1 AMPULE: 2.5; .5 SOLUTION RESPIRATORY (INHALATION) at 08:18

## 2023-04-05 ASSESSMENT — PAIN SCALES - GENERAL
PAINLEVEL_OUTOF10: 0
PAINLEVEL_OUTOF10: 0

## 2023-04-05 NOTE — PLAN OF CARE
Problem: Respiratory - Adult  Goal: Able to breathe comfortably  4/5/2023 0822 by Rosa Siegel RCP  Outcome: Progressing     Problem: Respiratory - Adult  Goal: Patient's breath sounds will be clear and equal  4/5/2023 0822 by Rosa Siegel RCP  Outcome: Progressing     Problem: Respiratory - Adult  Goal: Adequate oxygenation  Description: Adequate oxygenation  4/5/2023 0822 by Rosa Siegel RCP  Outcome: Progressing

## 2023-04-05 NOTE — PLAN OF CARE
Problem: Discharge Planning  Goal: Discharge to home or other facility with appropriate resources  Outcome: Progressing  Flowsheets (Taken 4/4/2023 2000)  Discharge to home or other facility with appropriate resources: Identify barriers to discharge with patient and caregiver     Problem: Pain  Goal: Verbalizes/displays adequate comfort level or baseline comfort level  Outcome: Progressing     Problem: Safety - Adult  Goal: Free from fall injury  Outcome: Progressing     Problem: Respiratory - Adult  Goal: Able to breathe comfortably  4/4/2023 1953 by Ernie Mcnela RCP  Outcome: Progressing  Goal: Patient's breath sounds will be clear and equal  4/4/2023 1953 by Ernie Mcneal RCP  Outcome: Progressing  Goal: Adequate oxygenation  Description: Adequate oxygenation  4/4/2023 1953 by Ernie Mcneal RCP  Outcome: Progressing     Problem: Chronic Conditions and Co-morbidities  Goal: Patient's chronic conditions and co-morbidity symptoms are monitored and maintained or improved  Outcome: Progressing  Flowsheets (Taken 4/4/2023 2000)  Care Plan - Patient's Chronic Conditions and Co-Morbidity Symptoms are Monitored and Maintained or Improved: Monitor and assess patient's chronic conditions and comorbid symptoms for stability, deterioration, or improvement

## 2023-04-05 NOTE — RT PROTOCOL NOTE
RT Inhaler-Nebulizer Bronchodilator Protocol Note    There is a bronchodilator order in the chart from a provider indicating to follow the RT Bronchodilator Protocol and there is an Initiate RT Inhaler-Nebulizer Bronchodilator Protocol order as well (see protocol at bottom of note). CXR Findings:  No results found. The findings from the last RT Protocol Assessment were as follows:   History Pulmonary Disease: Chronic pulmonary disease  Respiratory Pattern: Dyspnea on exertion or RR 21-25 bpm  Breath Sounds: Slightly diminished and/or crackles  Cough: Strong, spontaneous, non-productive  Indication for Bronchodilator Therapy: Decreased or absent breath sounds  Bronchodilator Assessment Score: 6    Aerosolized bronchodilator medication orders have been revised according to the RT Inhaler-Nebulizer Bronchodilator Protocol below. Respiratory Therapist to perform RT Therapy Protocol Assessment initially then follow the protocol. Repeat RT Therapy Protocol Assessment PRN for score 0-3 or on second treatment, BID, and PRN for scores above 3. No Indications - adjust the frequency to every 6 hours PRN wheezing or bronchospasm, if no treatments needed after 48 hours then discontinue using Per Protocol order mode. If indication present, adjust the RT bronchodilator orders based on the Bronchodilator Assessment Score as indicated below. Use Inhaler orders unless patient has one or more of the following: on home nebulizer, not able to hold breath for 10 seconds, is not alert and oriented, cannot activate and use MDI correctly, or respiratory rate 25 breaths per minute or more, then use the equivalent nebulizer order(s) with same Frequency and PRN reasons based on the score. If a patient is on this medication at home then do not decrease Frequency below that used at home.     0-3 - enter or revise RT bronchodilator order(s) to equivalent RT Bronchodilator order with Frequency of every 4 hours PRN for wheezing
order(s) to equivalent RT Bronchodilator order with Frequency of every 4 hours PRN for wheezing or increased work of breathing using Per Protocol order mode. 4-6 - enter or revise RT Bronchodilator order(s) to two equivalent RT bronchodilator orders with one order with BID Frequency and one order with Frequency of every 4 hours PRN wheezing or increased work of breathing using Per Protocol order mode. 7-10 - enter or revise RT Bronchodilator order(s) to two equivalent RT bronchodilator orders with one order with TID Frequency and one order with Frequency of every 4 hours PRN wheezing or increased work of breathing using Per Protocol order mode. 11-13 - enter or revise RT Bronchodilator order(s) to one equivalent RT bronchodilator order with QID Frequency and an Albuterol order with Frequency of every 4 hours PRN wheezing or increased work of breathing using Per Protocol order mode. Greater than 13 - enter or revise RT Bronchodilator order(s) to one equivalent RT bronchodilator order with every 4 hours Frequency and an Albuterol order with Frequency of every 2 hours PRN wheezing or increased work of breathing using Per Protocol order mode. RT to enter RT Home Evaluation for COPD & MDI Assessment order using Per Protocol order mode.     Electronically signed by Genevieve Hashimoto, RCP on 4/4/2023 at 7:58 PM
order(s) to equivalent RT Bronchodilator order with Frequency of every 4 hours PRN for wheezing or increased work of breathing using Per Protocol order mode. 4-6 - enter or revise RT Bronchodilator order(s) to two equivalent RT bronchodilator orders with one order with BID Frequency and one order with Frequency of every 4 hours PRN wheezing or increased work of breathing using Per Protocol order mode. 7-10 - enter or revise RT Bronchodilator order(s) to two equivalent RT bronchodilator orders with one order with TID Frequency and one order with Frequency of every 4 hours PRN wheezing or increased work of breathing using Per Protocol order mode. 11-13 - enter or revise RT Bronchodilator order(s) to one equivalent RT bronchodilator order with QID Frequency and an Albuterol order with Frequency of every 4 hours PRN wheezing or increased work of breathing using Per Protocol order mode. Greater than 13 - enter or revise RT Bronchodilator order(s) to one equivalent RT bronchodilator order with every 4 hours Frequency and an Albuterol order with Frequency of every 2 hours PRN wheezing or increased work of breathing using Per Protocol order mode. RT to enter RT Home Evaluation for COPD & MDI Assessment order using Per Protocol order mode.     Electronically signed by Juan Jordan RCP on 4/5/2023 at 8:20 AM

## 2023-04-05 NOTE — FLOWSHEET NOTE
End Of Shift Note  [facility]  Summary of shift: Pt had an uneventful night. Pt slept throughout the night. In beginning of the shift pt had two episodes of emesis. Pt throughout the rest of the night has denied any nausea. V/S stable, call light within reach.       Vitals:    Vitals:    04/05/23 0000 04/05/23 0030 04/05/23 0100 04/05/23 0400   BP: (!) 115/32 (!) 135/38 (!) 130/41 (!) 142/47   Pulse: 61   69   Resp: 17   16   Temp: 96.9 °F (36.1 °C)   96.8 °F (36 °C)   TempSrc: Temporal   Temporal   SpO2: 90%   95%   Weight:    155 lb 4.8 oz (70.4 kg)   Height:            I&O: No intake or output data in the 24 hours ending 04/05/23 0603    Resp Status: 2 L O2    Ventilator Settings:     / / /     Critical Care IV infusions:   sodium chloride          LDA:   Peripheral IV 04/04/23 Left Arm (Active)   Number of days: 0

## 2023-04-05 NOTE — FLOWSHEET NOTE
04/04/23 1948   Treatment Team Notification   Reason for Communication Review case;Evaluate   Team Member Name Mclain   Treatment Team Role Advanced Practice Nurse   Method of Communication Secure Message   Response See orders     NP covering Dr. Sridhar Prasad service was notified via Perfect serve regarding pt having two more episodes of emesis and pt stating that the Zofran did not help the nausea. Pt did not feel comfortable being discharged tonight. New orders received. Interventions to keep pt overnight and discharge tomorrow.

## 2023-04-05 NOTE — DISCHARGE SUMMARY
[facility]  Office: [address], , Alfredo Mojicasia, DO, Jeramie Lombardi, DO, Anders Shea Blood, DO, Shameka Byrne MD, Johann Riedel, MD, Osbaldo Liang MD, Sparkle Rodriguez MD,  Carlos Elliott MD, Elen Maloney MD, Denita Iverson, DO, Yohannes Meyer MD,  Tad Maurer MD, Jose Luis Lui MD, Wild Arnold DO, Simone Granados MD, Dexter Rosario MD, Shay Finley DO, Aimee Curry MD, Luh Botello MD, Milagros Townsend MD, Shawnee Sanders MD,  Arline Tavares DO, Mildred Zimmerman MD,  Ale Nuno, [facility],  Celia Preciado, CNP, Lupis Caballero, CNP, Luke Tang, CNP,  Gujnan Loo, [facility], Bindu Raines, CNP, Peter Lyles, CNP, Annalee Diane, CNP, Randell Toro, CNP, Hi Banerjee, CNP, Eric Hernandez PA-C, Rosey Harden, CNS, Heike Mccain, CNP, Jose Carlos Barboza, [facility]    Discharge Summary     Patient ID: Anselmo Jefferson  :  1945   MRN: [id number]     ACCOUNT:  [de-identified]   Patient's PCP: No primary care provider on file.   Admit Date: 4/3/2023   Discharge Date: 2023     Length of Stay: 2  Code Status:  Full Code  Admitting Physician: Elen Maloney MD  Discharge Physician: JORDAN Vaughn NP     Active Discharge Diagnoses:     Hospital Problem Lists:  Principal Problem (Resolved):    Acute exacerbation of chronic obstructive pulmonary disease (COPD) ([facility]  Active Problems:    Other emphysema ([facility]    Essential hypertension    Hypothyroidism    Pulmonary hypertension ([facility]    New onset of congestive heart failure ([facility]    Acute deep vein thrombosis (DVT) of distal vein of left lower extremity ([address]      Admission Condition:  fair     Discharged Condition: stable    Hospital Stay:     Hospital Course:  Anselmo Jefferson is a 77 y.o. female who was admitted for the management of  Acute exacerbation of chronic obstructive pulmonary disease (COPD) ([address] , presented to ER with Shortness of

## 2023-04-05 NOTE — PROGRESS NOTES
CLINICAL PHARMACY NOTE: MEDS TO BEDS    Total # of Prescriptions Filled: 6   The following medications were delivered to the patient:  Ondansetron 4mg  Albuterol 108 inhaler  Eliquis 5mg  Spiriva hanihaler 18mcg  Budesonide-formoterol 160-4.5 inhaler  Azithromycin 500mg    Additional Documentation:
Home Oxygen Evaluation    Home Oxygen Evaluation completed.     SpO2 on room air at rest 87%    SpO2 on 2 lpm Nasal cannula at rest 94%    Testing done at [address] [place] [address]  2:34 PM
Physician Progress Note      Mandy Morse  CSN #:                  [id number]  :                       1945  ADMIT DATE:       4/3/2023 3:51 PM  [address] [place] DATE:  RESPONDING  PROVIDER #:        Marli Vazquez MD          QUERY TEXT:    Pt admitted with COPD. Pt noted to have elevated ProBNP. If possible, please   document in progress notes and discharge summary if you are evaluating and/or   treating any of the following: The medical record reflects the following:  Risk Factors: CKD, HTN  Clinical Indicators: ProBNP of 866[address], 2d Echo shows Ef of 60-65%, Moderate to   severe concentric left ventricular hypertrophy, sob, CXR is shows cardiomegaly  Treatment: IV Lasix, 2d Echo, Cardio    Thank you,  Omega Moritz, RN  Options provided:  -- Acute Systolic CHF/HFrEF  -- Acute Diastolic CHF/HFpEF  -- Acute Systolic and Diastolic CHF  -- Other - I will add my own diagnosis  -- Disagree - Not applicable / Not valid  -- Disagree - Clinically unable to determine / Unknown  -- Refer to Clinical Documentation Reviewer    PROVIDER RESPONSE TEXT:    This patient is in acute systolic and diastolic CHF. Query created by:  Lynne Bautista on 2023 12:44 PM      Electronically signed by:  Marli Vazquez MD 2023 1:15 PM
Pt admitted to room 2037 per cart in good condition from [facility] ED  Oriented to room and surroundings  Bed in lowest position, wheels locked, 2/4 side rails up  Call light in reach, room free of clutter, adequate lighting provided  Denies any further questions at this time  Instructed to call out with any questions/concerns/new onset of pain and/or n/v   White board updated  Continue to monitor with hourly rounding  STAY WITH ME protocol initiated   Bed alarm on/Fall Risk signs in place/Fall risk sticker to wrist band  Non-skid socks on/at bedside
negative for cough, dyspnea on exertion, shortness of breath, wheezing  Cardiovascular:  negative for chest pain, chest pressure/discomfort, lower extremity edema, palpitations  Gastrointestinal:  negative for abdominal pain, constipation, diarrhea, nausea, vomiting  Neurological:  negative for dizziness, headache    Medications: Allergies: Allergies   Allergen Reactions    Sulfa Antibiotics     Sulfamethoxazole-Trimethoprim Rash       Current Meds:   Scheduled Meds:    predniSONE  40 mg Oral Daily    lisinopril  5 mg Oral Daily    sodium chloride flush  5-40 mL IntraVENous 2 times per day    enoxaparin  40 mg SubCUTAneous Daily    famotidine  20 mg Oral Daily    ipratropium-albuterol  1 ampule Inhalation BID     Continuous Infusions:    sodium chloride      sodium chloride 50 mL/hr at 23 0608     PRN Meds: ALPRAZolam, sodium chloride flush, sodium chloride flush, sodium chloride, ondansetron **OR** ondansetron, polyethylene glycol, acetaminophen **OR** acetaminophen, albuterol    Data:     Past Medical History:   has a past medical history of AAA (abdominal aortic aneurysm) ([facility] Dehydration, DVT (deep venous thrombosis) ([facility] Fall, Foot ulcer, left ([facility] History of pulmonary embolus (PE), Hypothyroid, Memory loss, Systolic murmur, Tinea pedis, and UTI (urinary tract infection). Social History:   reports that she has never smoked. She has never used smokeless tobacco. She reports that she does not currently use alcohol. She reports that she does not use drugs. Family History:   Family History   Problem Relation Age of Onset    Cancer Mother     No Known Problems Father        Vitals:  BP (!) 103/59   Pulse 69   Temp 97.9 °F (36.6 °C) (Temporal)   Resp 19   Ht 5' 9\" (1.753 m)   Wt 155 lb (70.3 kg)   SpO2 94%   BMI 22.89 kg/m²   Temp (24hrs), Av.8 °F (36.6 °C), Min:97.5 °F (36.4 °C), Max:98.1 °F (36.7 °C)    No results for input(s): POCGLU in the last 72 hours. I/O (24Hr):   No
were higher than her baseline     Currently on 3 L NC. Patient initially presented to [facility] ED where she was noted to have an elevated proBNP and D-dimer. Cross-sectional imaging revealed pulmonary hypertension without evidence of pulmonary embolism. She also had increasing oxygen requirements while there and required 3 L nasal cannula decision was made to transfer patient here    Review of Systems:     Constitutional:  negative for chills, fevers, sweats  Respiratory:  +cough, dyspnea on exertion, +shortness of breath, wheezing  Cardiovascular:  negative for chest pain, chest pressure/discomfort, lower extremity edema, palpitations  Gastrointestinal:  negative for abdominal pain, constipation, diarrhea, nausea, vomiting  Neurological:  negative for dizziness, headache    Medications: Allergies: Allergies   Allergen Reactions    Sulfa Antibiotics     Sulfamethoxazole-Trimethoprim Rash       Current Meds:   Scheduled Meds:    predniSONE  40 mg Oral Daily    lisinopril  5 mg Oral Daily    furosemide  20 mg Oral Daily    sodium chloride flush  5-40 mL IntraVENous 2 times per day    enoxaparin  40 mg SubCUTAneous Daily    famotidine  20 mg Oral Daily    ipratropium-albuterol  1 ampule Inhalation BID     Continuous Infusions:    sodium chloride       PRN Meds: ALPRAZolam, promethazine, sodium chloride flush, sodium chloride flush, sodium chloride, polyethylene glycol, acetaminophen **OR** acetaminophen, albuterol    Data:     Past Medical History:   has a past medical history of AAA (abdominal aortic aneurysm) ([facility] Dehydration, DVT (deep venous thrombosis) ([facility] [facility] Fall, Foot ulcer, left ([facility] History of pulmonary embolus (PE), Hypothyroid, Memory loss, Systolic murmur, Tinea pedis, and UTI (urinary tract infection). Social History:   reports that she has never smoked. She has never used smokeless tobacco. She reports that she does not currently use alcohol. She reports that she does not use drugs.
History  Lives With: Alone  Type of Home: Apartment  Home Layout: One level  Home Access: Stairs to enter with rails  Entrance Stairs - Number of Steps: 2  Entrance Stairs - Rails: Both  Bathroom Shower/Tub: Tub/Shower unit  Bathroom Toilet: Standard  Bathroom Equipment: Shower chair, Grab bars in shower (vanity is close to toilet)  Bathroom Accessibility: Accessible  Home Equipment: Rollator  Has the patient had two or more falls in the past year or any fall with injury in the past year?:  (Pt reports one fall in the last year while making her bed.)  Receives Help From:  (Pt reports minimal family support, recently moved from [facility] after  passed)  ADL Assistance: [address] [place] [address] Avenue: Independent  Homemaking Responsibilities: Yes  Ambulation Assistance: Independent (used rollator at all times)  Transfer Assistance: Independent  Active : No  Patient's  Info: Family can drive  Mode of Transportation: Car  Occupation: Retired  Type of Occupation:  work  Leisure & Hobbies: computer, watching tv       Objective   Observation/Palpation  Posture: Fair  Scar: R ankle/distal leg healed wound  Safety Devices  Type of Devices: Bed alarm in place;Call light within reach; Left in bed;Patient at risk for falls;Nurse notified;Gait belt      Balance  Sitting:  (SBA)  Standing:  (CGA with RW)  Functional Mobility   Overall Level of Assistance: Contact-guard assistance (Pt completed functional mobility from bed to window and back x3 with RW. Assist needed for all line mgmt.)  Interventions: Verbal cues; Tactile cues (Min verbal cues for pacing self, upright posture, line mgt, pursed lip breathing and RW safety all to increase safety.)       AROM: Within functional limits  Strength: Generally decreased, functional (B UE ~ 4/5)  Coordination: Within functional limits  Tone: Normal  Sensation: Impaired (B feet neuropathy, L from triple A R from clot)      ADL  Feeding: Setup  Grooming: